# Patient Record
Sex: MALE | Race: WHITE | Employment: FULL TIME | URBAN - METROPOLITAN AREA
[De-identification: names, ages, dates, MRNs, and addresses within clinical notes are randomized per-mention and may not be internally consistent; named-entity substitution may affect disease eponyms.]

---

## 2021-06-29 ENCOUNTER — OFFICE VISIT (OUTPATIENT)
Dept: FAMILY MEDICINE CLINIC | Age: 36
End: 2021-06-29
Payer: COMMERCIAL

## 2021-06-29 VITALS
DIASTOLIC BLOOD PRESSURE: 72 MMHG | HEART RATE: 71 BPM | SYSTOLIC BLOOD PRESSURE: 112 MMHG | OXYGEN SATURATION: 97 % | HEIGHT: 69 IN | WEIGHT: 167.4 LBS | TEMPERATURE: 98.2 F | RESPIRATION RATE: 14 BRPM | BODY MASS INDEX: 24.79 KG/M2

## 2021-06-29 DIAGNOSIS — Z13.220 SCREENING, LIPID: ICD-10-CM

## 2021-06-29 DIAGNOSIS — Z13.1 SCREENING FOR DIABETES MELLITUS: ICD-10-CM

## 2021-06-29 DIAGNOSIS — Z13.0 SCREENING FOR DEFICIENCY ANEMIA: ICD-10-CM

## 2021-06-29 DIAGNOSIS — Z11.59 ENCOUNTER FOR HEPATITIS C SCREENING TEST FOR LOW RISK PATIENT: ICD-10-CM

## 2021-06-29 DIAGNOSIS — Z13.29 SCREENING FOR THYROID DISORDER: ICD-10-CM

## 2021-06-29 DIAGNOSIS — H93.A9 PULSATILE TINNITUS: ICD-10-CM

## 2021-06-29 DIAGNOSIS — Z13.21 ENCOUNTER FOR VITAMIN DEFICIENCY SCREENING: ICD-10-CM

## 2021-06-29 DIAGNOSIS — Z13.89 SCREENING FOR BLOOD OR PROTEIN IN URINE: ICD-10-CM

## 2021-06-29 DIAGNOSIS — Z82.49 FAMILY HISTORY OF CORONARY ARTERY DISEASE IN FATHER: ICD-10-CM

## 2021-06-29 DIAGNOSIS — R51.9 PRESSURE IN HEAD: ICD-10-CM

## 2021-06-29 DIAGNOSIS — Z76.89 ENCOUNTER TO ESTABLISH CARE: Primary | ICD-10-CM

## 2021-06-29 PROCEDURE — 99203 OFFICE O/P NEW LOW 30 MIN: CPT | Performed by: FAMILY MEDICINE

## 2021-06-29 NOTE — PROGRESS NOTES
Identified pt with two pt identifiers(name and ). Chief Complaint   Patient presents with   [address] [facility]    Head Pain     pressure in head when he bends down    Other     family HX of heart disease       Vitals:    21 1011   BP: 112/72   Pulse: 71   Resp: 14   Temp: 98.2 °F (36.8 °C)   TempSrc: Oral   SpO2: 97%   Weight: 167 lb 6.4 oz (75.9 kg)   Height: 5' 9\" (1.753 m)   PainSc:   0 - No pain      Health Maintenance Due   Topic    Hepatitis C Screening     DTaP/Tdap/Td series (1 - Tdap)       Depression Screening:  :     3 most recent PHQ Screens 2021   Little interest or pleasure in doing things Not at all   Feeling down, depressed, irritable, or hopeless Not at all   Total Score PHQ 2 0          Abuse Screening:  :     Abuse Screening Questionnaire 2021   Do you ever feel afraid of your partner? N   Are you in a relationship with someone who physically or mentally threatens you? N   Is it safe for you to go home? Y        Coordination of Care Questionnaire:  :     1. Have you been to the ER, urgent care clinic since your last visit? Hospitalized since your last visit? No    2. Have you seen or consulted any other health care providers outside of the [address] [place] [address] since your last visit? Include any pap smears or colon screening.  No

## 2021-06-29 NOTE — PROGRESS NOTES
Ave Ballesteros  43 y.o. male  1985  [address] [facility]  [id number]     [address] [facility]       Encounter Date: 6/29/2021           New Patient Visit Note: Giuseppe Rich MD    Previous PCP:urgent care    Reason for Appointment:  Chief Complaint   Patient presents with    New Patient    Head Pain     pressure in head when he bends down    Other     family HX of heart disease       History of Present Illness:  History provided by patient    Ave Ballesteros is a 43 y.o. male who presents to clinic today for appointment to establish care. Conditions addressed today include:     Pressure in Head/Pulastile tinnitus: he reports that he notices this more when he is in the shower. He also noticies it when he bends forward. He has history of headaches, but he feels that this is different. Family Hx of CAD in Father: First MI around age 72  He reports having a lot of seposure to smoke as a child  He reports that there are times when he feel that his BP is elevated  He reports that he would like to have children with his wife and he wants to be as healthy as possible. He reports occasional wine, but not very much. 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 Medications: (Updated to reflect final medication list after visit)  No current outpatient medications on file. History  Patient Care Team:  Brandt Ibanez MD as PCP - General (Family Medicine)  Brandt Ibanez MD as PCP - [facility]    Past Medical History: he has no past medical history on file. Past Surgical History: he has a past surgical history that includes hx wisdom teeth extraction. Family Medical History: family history includes Heart Attack in his father and paternal uncle.     Social History: he reports that he has never smoked. He has never used smokeless tobacco. He reports current alcohol use of about 4.0 standard drinks of alcohol per week. He reports that he does not use drugs. Hobbies include gardening, running, movies. Health Maintenance Due   Topic Date Due    DTaP/Tdap/Td series (1 - Tdap) Never done       Objective:   Visit Vitals  /72 (BP 1 Location: Left arm, BP Patient Position: Sitting, BP Cuff Size: Small adult)   Pulse 71   Temp 98.2 °F (36.8 °C) (Oral)   Resp 14   Ht 5' 9\" (1.753 m)   Wt 167 lb 6.4 oz (75.9 kg)   SpO2 97%   BMI 24.72 kg/m²     Wt Readings from Last 3 Encounters:   06/29/21 167 lb 6.4 oz (75.9 kg)       Physical Exam  Constitutional:       Appearance: Normal appearance. HENT:      Head: Normocephalic and atraumatic. Right Ear: External ear normal.      Left Ear: External ear normal.      Nose: Nose normal.      Mouth/Throat:      Pharynx: No oropharyngeal exudate. Eyes:      General: No scleral icterus. Right eye: No discharge. Left eye: No discharge. Conjunctiva/sclera: Conjunctivae normal.      Pupils: Pupils are equal, round, and reactive to light. Neck:      Thyroid: No thyromegaly. Vascular: No JVD. Trachea: No tracheal deviation. Cardiovascular:      Rate and Rhythm: Normal rate and regular rhythm. Heart sounds: Normal heart sounds. No murmur heard. No friction rub. No gallop. Pulmonary:      Effort: Pulmonary effort is normal. No respiratory distress. Breath sounds: Normal breath sounds. No stridor. No wheezing. Musculoskeletal:         General: No tenderness or deformity. Normal range of motion. Cervical back: Normal range of motion and neck supple. Lymphadenopathy:      Cervical: No cervical adenopathy. Skin:     General: Skin is warm and dry. Neurological:      Mental Status: He is alert. Cranial Nerves: No cranial nerve deficit.       Coordination: Coordination normal.      Gait: Gait is intact. Deep Tendon Reflexes: Reflexes normal.         Assessment & Plan:      ICD-10-CM ICD-9-CM    1. Encounter to establish care  Z76.89 V65.8    2. Family history of coronary artery disease in father  Z80.55 V17.3    3. Pressure in head  R51.9 784.0 MRI BRAIN WO CONT      TSH 3RD GENERATION      TSH 3RD GENERATION   4. Pulsatile tinnitus  H93. A9 388.30 MRI BRAIN WO CONT      TSH 3RD GENERATION      TSH 3RD GENERATION   5. Screening for thyroid disorder  Z13.29 V77.0 TSH 3RD GENERATION      T4, FREE      T4, FREE      TSH 3RD GENERATION   6. Screening for blood or protein in urine  Z13.89 V82.9 URINALYSIS W/ RFLX MICROSCOPIC      URINALYSIS W/ RFLX MICROSCOPIC   7. Screening, lipid  Z13.220 V77.91 LIPID PANEL      METABOLIC PANEL, COMPREHENSIVE      METABOLIC PANEL, COMPREHENSIVE      LIPID PANEL   8. Screening for diabetes mellitus  Z13.1 V77.1 HEMOGLOBIN A1C WITH EAG      HEMOGLOBIN A1C WITH EAG   9. Screening for deficiency anemia  Z13.0 V78.1 CBC W/O DIFF      CBC W/O DIFF   10. Encounter for vitamin deficiency screening  Z13.21 V77.99 VITAMIN D, 25 HYDROXY      VITAMIN D, 25 HYDROXY   11. Encounter for hepatitis C screening test for low risk patient  Z11.59 V73.89 HCV AB W/RFLX TO NICCI      HCV AB W/RFLX TO NICCI        Encounter to Establish Care: New patient visit; reviewed and addressed patient's medical history and concerns as discussed in note. Discussed recommendations for diet, exercise, and lifestyle.  Pressure in Head and Pulsatile tinnitus: Chronic, uncontrolled. Obtain MRI as above.  RTC 4 weeks to discuss lab results and imaging      I have discussed the diagnosis with the patient and the intended plan as seen in the above orders. The patient has received an after-visit summary along with patient information handout. I have discussed medication side effects and warnings with the patient as well.     Disposition  Follow-up and Dispositions    · Return in about 9 weeks (around 8/31/2021) for annual physical.           Laure Foss MD

## 2021-06-30 LAB
25(OH)D3 SERPL-MCNC: 28.7 NG/ML (ref 30–100)
ALBUMIN SERPL-MCNC: 4.4 G/DL (ref 3.5–5)
ALBUMIN/GLOB SERPL: 1.5 {RATIO} (ref 1.1–2.2)
ALP SERPL-CCNC: 49 U/L (ref 45–117)
ALT SERPL-CCNC: 31 U/L (ref 12–78)
ANION GAP SERPL CALC-SCNC: 4 MMOL/L (ref 5–15)
APPEARANCE UR: CLEAR
AST SERPL-CCNC: 17 U/L (ref 15–37)
BILIRUB SERPL-MCNC: 0.5 MG/DL (ref 0.2–1)
BILIRUB UR QL: NEGATIVE
BUN SERPL-MCNC: 19 MG/DL (ref 6–20)
BUN/CREAT SERPL: 21 (ref 12–20)
CALCIUM SERPL-MCNC: 9.8 MG/DL (ref 8.5–10.1)
CHLORIDE SERPL-SCNC: 107 MMOL/L (ref 97–108)
CHOLEST SERPL-MCNC: 199 MG/DL
CO2 SERPL-SCNC: 29 MMOL/L (ref 21–32)
COLOR UR: NORMAL
CREAT SERPL-MCNC: 0.91 MG/DL (ref 0.7–1.3)
ERYTHROCYTE [DISTWIDTH] IN BLOOD BY AUTOMATED COUNT: 12.9 % (ref 11.5–14.5)
EST. AVERAGE GLUCOSE BLD GHB EST-MCNC: 100 MG/DL
GLOBULIN SER CALC-MCNC: 3 G/DL (ref 2–4)
GLUCOSE SERPL-MCNC: 96 MG/DL (ref 65–100)
GLUCOSE UR STRIP.AUTO-MCNC: NEGATIVE MG/DL
HBA1C MFR BLD: 5.1 % (ref 4–5.6)
HCT VFR BLD AUTO: 45.2 % (ref 36.6–50.3)
HDLC SERPL-MCNC: 57 MG/DL
HDLC SERPL: 3.5 {RATIO} (ref 0–5)
HGB BLD-MCNC: 14.9 G/DL (ref 12.1–17)
HGB UR QL STRIP: NEGATIVE
KETONES UR QL STRIP.AUTO: NEGATIVE MG/DL
LDLC SERPL CALC-MCNC: 126.4 MG/DL (ref 0–100)
LEUKOCYTE ESTERASE UR QL STRIP.AUTO: NEGATIVE
MCH RBC QN AUTO: 29.7 PG (ref 26–34)
MCHC RBC AUTO-ENTMCNC: 33 G/DL (ref 30–36.5)
MCV RBC AUTO: 90.2 FL (ref 80–99)
NITRITE UR QL STRIP.AUTO: NEGATIVE
NRBC # BLD: 0 K/UL (ref 0–0.01)
NRBC BLD-RTO: 0 PER 100 WBC
PH UR STRIP: 7.5 [PH] (ref 5–8)
PLATELET # BLD AUTO: 202 K/UL (ref 150–400)
PMV BLD AUTO: 10 FL (ref 8.9–12.9)
POTASSIUM SERPL-SCNC: 4.4 MMOL/L (ref 3.5–5.1)
PROT SERPL-MCNC: 7.4 G/DL (ref 6.4–8.2)
PROT UR STRIP-MCNC: NEGATIVE MG/DL
RBC # BLD AUTO: 5.01 M/UL (ref 4.1–5.7)
SODIUM SERPL-SCNC: 140 MMOL/L (ref 136–145)
SP GR UR REFRACTOMETRY: 1.02 (ref 1–1.03)
T4 FREE SERPL-MCNC: 0.8 NG/DL (ref 0.8–1.5)
TRIGL SERPL-MCNC: 78 MG/DL (ref ?–150)
TSH SERPL DL<=0.05 MIU/L-ACNC: 1.7 UIU/ML (ref 0.36–3.74)
UROBILINOGEN UR QL STRIP.AUTO: 0.2 EU/DL (ref 0.2–1)
VLDLC SERPL CALC-MCNC: 15.6 MG/DL
WBC # BLD AUTO: 4.5 K/UL (ref 4.1–11.1)

## 2021-07-01 LAB
HCV AB S/CO SERPL IA: <0.1 S/CO RATIO (ref 0–0.9)
HCV AB SERPL QL IA: NORMAL

## 2021-07-07 PROBLEM — Z82.49 FAMILY HISTORY OF CORONARY ARTERY DISEASE IN FATHER: Status: ACTIVE | Noted: 2021-07-07

## 2021-07-07 PROBLEM — H93.A9 PULSATILE TINNITUS: Status: ACTIVE | Noted: 2021-07-07

## 2021-07-07 PROBLEM — R51.9 PRESSURE IN HEAD: Status: ACTIVE | Noted: 2021-07-07

## 2021-09-28 ENCOUNTER — OFFICE VISIT (OUTPATIENT)
Dept: FAMILY MEDICINE CLINIC | Age: 36
End: 2021-09-28
Payer: COMMERCIAL

## 2021-09-28 VITALS
OXYGEN SATURATION: 97 % | HEART RATE: 73 BPM | HEIGHT: 69 IN | BODY MASS INDEX: 25.48 KG/M2 | WEIGHT: 172 LBS | RESPIRATION RATE: 16 BRPM | TEMPERATURE: 98.6 F | DIASTOLIC BLOOD PRESSURE: 65 MMHG | SYSTOLIC BLOOD PRESSURE: 110 MMHG

## 2021-09-28 DIAGNOSIS — E55.9 VITAMIN D INSUFFICIENCY: ICD-10-CM

## 2021-09-28 DIAGNOSIS — H93.A9 PULSATILE TINNITUS: ICD-10-CM

## 2021-09-28 DIAGNOSIS — R51.9 PRESSURE IN HEAD: ICD-10-CM

## 2021-09-28 DIAGNOSIS — Z00.00 ANNUAL PHYSICAL EXAM: Primary | ICD-10-CM

## 2021-09-28 DIAGNOSIS — Z23 ENCOUNTER FOR IMMUNIZATION: ICD-10-CM

## 2021-09-28 DIAGNOSIS — Z82.49 FAMILY HISTORY OF CORONARY ARTERY DISEASE IN FATHER: ICD-10-CM

## 2021-09-28 PROCEDURE — 90471 IMMUNIZATION ADMIN: CPT | Performed by: FAMILY MEDICINE

## 2021-09-28 PROCEDURE — 99395 PREV VISIT EST AGE 18-39: CPT | Performed by: FAMILY MEDICINE

## 2021-09-28 PROCEDURE — 90715 TDAP VACCINE 7 YRS/> IM: CPT | Performed by: FAMILY MEDICINE

## 2021-09-28 NOTE — PROGRESS NOTES
Chief Complaint   Patient presents with    Complete Physical        1. \"Have you been to the ER, urgent care clinic since your last visit? Hospitalized since your last visit? \" No    2. \"Have you seen or consulted any other health care providers outside of the [address] [place] [address] since your last visit? \" No     3. For patients aged 39-70: Has the patient had a colonoscopy? No     If the patient is female:    4. For patients aged 41-77: Has the patient had a mammogram within the past 2 years? No    5. For patients aged 21-30: Has the patient had a pap smear?  No    3 most recent PHQ Screens 9/28/2021   Little interest or pleasure in doing things Not at all   Feeling down, depressed, irritable, or hopeless Not at all   Total Score PHQ 2 0

## 2021-09-28 NOTE — PROGRESS NOTES
Zulema Loredo  43 y.o. male  1985  [address] [facility]  [id number]     [address] [facility]       Encounter Date: 9/28/2021           Established Patient Visit Note: Sanna Bhandari MD    Reason for Appointment:  Chief Complaint   Patient presents with    Complete Physical    Immunization/Injection     TDAP       History of Present Illness:  History provided by patient    Zulema Loredo is a 43 y.o. male who presents to clinic today for:    Annual Physical  Last Physical: > 1 year ago  Screenings: denies any concern for depression or STI; last lipids were June 2021  Immunizations: has had COVID vaccine;  Due for flu and Tdap  Diet: he eats a variety of vegetables and meats, drinks milk and water  Exercise: walks regularly, runs in evening, works around yard  Interval History: denies any recent hospitalizations or illnesses    · Pressure in Head/Pulsatile Tinnitus: MRI ordered at last visit, but he has not yet had this done. He reports that he continues to have intermittnet symtpoms, but not as severe as they were. 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 Medications: (Updated to reflect final medication list after visit)  No current outpatient medications on file. History  Patient Care Team:  Lorena Colon MD as PCP - General (Family Medicine)  Lorena Colon MD as PCP - [facility]    Past Medical History: he has no past medical history on file. Past Surgical History: he has a past surgical history that includes hx wisdom teeth extraction.     Family Medical History: family history includes Heart Attack in his father and paternal uncle. Social History: he reports that he has never smoked. He has never used smokeless tobacco. He reports current alcohol use of about 4.0 standard drinks of alcohol per week. He reports that he does not use drugs. Health Maintenance Due   Topic Date Due    Flu Vaccine (1) Never done       Objective:   Visit Vitals  /65   Pulse 73   Temp 98.6 °F (37 °C)   Resp 16   Ht 5' 9\" (1.753 m)   Wt 172 lb (78 kg)   SpO2 97%   BMI 25.40 kg/m²     Wt Readings from Last 3 Encounters:   09/28/21 172 lb (78 kg)   06/29/21 167 lb 6.4 oz (75.9 kg)       Physical Exam  HENT:      Head: Normocephalic and atraumatic. Right Ear: External ear normal.      Left Ear: External ear normal.      Nose: Nose normal.      Mouth/Throat:      Mouth: Mucous membranes are moist.      Pharynx: No oropharyngeal exudate. Eyes:      General: Lids are normal. No scleral icterus. Right eye: No discharge. Left eye: No discharge. Extraocular Movements: Extraocular movements intact. Conjunctiva/sclera: Conjunctivae normal.      Pupils: Pupils are equal, round, and reactive to light. Neck:      Thyroid: No thyromegaly. Vascular: No JVD. Trachea: No tracheal deviation. Cardiovascular:      Rate and Rhythm: Normal rate and regular rhythm. Pulses:           Radial pulses are 2+ on the right side and 2+ on the left side. Heart sounds: Normal heart sounds. No murmur heard. No friction rub. No gallop. Pulmonary:      Effort: Pulmonary effort is normal. No respiratory distress. Breath sounds: Normal breath sounds. No stridor. No wheezing. Abdominal:      General: Bowel sounds are normal. There is no distension. Palpations: Abdomen is soft. There is no mass. Tenderness: There is no abdominal tenderness. There is no guarding or rebound. Musculoskeletal:         General: No tenderness or deformity. Normal range of motion. Cervical back: Normal range of motion and neck supple. Right lower leg: No edema. Left lower leg: No edema. Lymphadenopathy:      Cervical: No cervical adenopathy. Skin:     General: Skin is warm and dry. Neurological:      Mental Status: He is alert. Cranial Nerves: No cranial nerve deficit. Coordination: Coordination normal.      Gait: Gait is intact. Deep Tendon Reflexes: Reflexes normal.   Psychiatric:         Mood and Affect: Mood normal.         Behavior: Behavior normal.         Thought Content: Thought content normal.         Assessment & Plan:      ICD-10-CM ICD-9-CM    1. Annual physical exam  Z00.00 V70.0    2. Family history of coronary artery disease in father  Z80.55 V17.3 CT HEART W/O CONT WITH CALCIUM   3. Pressure in head  R51.9 784.0    4. Pulsatile tinnitus  H93. A9 388.30    5. Vitamin D insufficiency  E55.9 268.9    6. Encounter for immunization  Z23 V03.89 [place] IMMUNIZ ADMIN,1 SINGLE/COMB VAC/TOXOID      TETANUS, DIPHTHERIA TOXOIDS AND ACELLULAR PERTUSSIS VACCINE (TDAP), IN INDIVIDS. >=7, IM      Annual Physical Exam: Reviewed and addressed patient's medical history and concerns as discussed in note. Reviewed recommended screenings and immunizations. Discussed recommendations for diet, exercise, and lifestyle. · Family Hx of CAD: Chronic, unclear status. Discussed options. Patient would like to obtain CT to further stratify risk. · Pressure in head, Pulsatile tinnitus: Chronic, uncontrolled. Advised patient to obtain MRI as previously ordered. · Vitamin D Insufficiency: Chronic, uncontrolled. Start OTC vitamin D supplementation. · Immunizations: Provided immunizations as written above. Discussed risks/benefits of vaccination, and patient would like to proceed. Provided vaccine information sheet to patient. Discussed precautions and reasons to call or go to ED.    · Declines flu vaccine, but he is agreeable to Tdap        I have discussed the diagnosis with the patient and the intended plan as seen in the above orders. The patient has received an after-visit summary along with patient information handout. I have discussed medication side effects and warnings with the patient as well. Disposition  Follow-up and Dispositions    · Return in about 6 months (around 3/28/2022) for tinnitus.            Chiki Nicole MD

## 2021-09-28 NOTE — PROGRESS NOTES
Chief Complaint   Patient presents with    Complete Physical    Immunization/Injection     TDAP     Nadia Obrien  is a 43 y.o.  male  who present for TDAP immunizations/injections. He/she denies any symptoms , reactions or allergies that would exclude them from being immunized today. Risks and adverse reactions were discussed and the VIS was given if applicable to them. All questions were addressed. He/She was observed for 10 min post injection. There were no reactions observed.     Aurelia Hernandez LPN

## 2022-03-28 ENCOUNTER — OFFICE VISIT (OUTPATIENT)
Dept: FAMILY MEDICINE CLINIC | Age: 37
End: 2022-03-28
Payer: COMMERCIAL

## 2022-03-28 VITALS
HEIGHT: 69 IN | OXYGEN SATURATION: 99 % | TEMPERATURE: 97.7 F | SYSTOLIC BLOOD PRESSURE: 100 MMHG | HEART RATE: 66 BPM | DIASTOLIC BLOOD PRESSURE: 60 MMHG | WEIGHT: 175.4 LBS | RESPIRATION RATE: 18 BRPM | BODY MASS INDEX: 25.98 KG/M2

## 2022-03-28 DIAGNOSIS — Z78.9 LDL-C GREATER THAN OR EQUAL TO 100 MG/DL: ICD-10-CM

## 2022-03-28 DIAGNOSIS — E55.9 VITAMIN D INSUFFICIENCY: ICD-10-CM

## 2022-03-28 DIAGNOSIS — Z01.83 ENCOUNTER FOR BLOOD TYPING: ICD-10-CM

## 2022-03-28 DIAGNOSIS — Z82.49 FAMILY HISTORY OF CORONARY ARTERY DISEASE IN FATHER: Primary | ICD-10-CM

## 2022-03-28 DIAGNOSIS — H93.A9 PULSATILE TINNITUS: ICD-10-CM

## 2022-03-28 DIAGNOSIS — R51.9 PRESSURE IN HEAD: ICD-10-CM

## 2022-03-28 PROCEDURE — 99213 OFFICE O/P EST LOW 20 MIN: CPT | Performed by: FAMILY MEDICINE

## 2022-03-28 NOTE — PROGRESS NOTES
Teresa Ch  43 y.o. male  1985  [address] [facility]  [id number]     [address] [facility]       Encounter Date: 3/28/2022           Established Patient Visit Note: Elly Jhaveri MD    Reason for Appointment:  Chief Complaint   Patient presents with    Follow-up     6 mth          History of Present Illness:  History provided by patient    Teresa Ch is a 43 y.o. male who presents to clinic today for:     He is interested in learning his bloodtype  · Family Hx of CAD:CT Calcium ordered at last visit, but he would like to hold off on this for now. · Pressure in head, Pulsatile tinnitus: He reports that pulsatile tinnitis has resolved, but the pressure tends comes from time to time. He would like to hold off on MRI that was previously ordered. · Vitamin D Insufficiency: he is not taking vitamin d; last Vit D was 6/29/21  · Elevated LDL: he has been trying to eat healthier (more greens and vegetables)  · He has been walking more, including swimming and running          HM  He reports having booster COVID vaccine  Declines Flu vaccine      Review of Systems  All other ROS were reviewed and are negative except as discussed in HPI        Allergies: Patient has no known allergies. Medications:   No current outpatient medications on file. History  Patient Care Team:  Kenneth Lim MD as PCP - General (Family Medicine)  Kenneth Lim MD as PCP - [facility] Provider    Past Medical History: he has no past medical history on file. Past Surgical History: he has a past surgical history that includes hx wisdom teeth extraction. Family Medical History: family history includes Heart Attack in his father and paternal uncle. Social History: he reports that he has never smoked. He has never used smokeless tobacco. He reports current alcohol use of about 4.0 standard drinks of alcohol per week. He reports that he does not use drugs.         Objective: Visit Vitals  /60 (BP 1 Location: Left upper arm, BP Patient Position: Sitting, BP Cuff Size: Large adult)   Pulse 66   Temp 97.7 °F (36.5 °C) (Temporal)   Resp 18   Ht 5' 9\" (1.753 m)   Wt 175 lb 6.4 oz (79.6 kg)   SpO2 99%   BMI 25.90 kg/m²     Wt Readings from Last 3 Encounters:   03/28/22 175 lb 6.4 oz (79.6 kg)   09/28/21 172 lb (78 kg)   06/29/21 167 lb 6.4 oz (75.9 kg)       Physical Exam  Constitutional:       Appearance: Normal appearance. HENT:      Head: Normocephalic and atraumatic. Right Ear: External ear normal.      Left Ear: External ear normal.      Nose: Nose normal.      Mouth/Throat:      Pharynx: No oropharyngeal exudate. Eyes:      General: No scleral icterus. Right eye: No discharge. Left eye: No discharge. Conjunctiva/sclera: Conjunctivae normal.      Pupils: Pupils are equal, round, and reactive to light. Neck:      Thyroid: No thyromegaly. Vascular: No JVD. Trachea: No tracheal deviation. Cardiovascular:      Rate and Rhythm: Normal rate and regular rhythm. Heart sounds: Normal heart sounds. No murmur heard. No friction rub. No gallop. Pulmonary:      Effort: Pulmonary effort is normal. No respiratory distress. Breath sounds: Normal breath sounds. No stridor. No wheezing. Musculoskeletal:         General: No tenderness or deformity. Normal range of motion. Cervical back: Normal range of motion and neck supple. Lymphadenopathy:      Cervical: No cervical adenopathy. Skin:     General: Skin is warm and dry. Neurological:      Mental Status: He is alert. Cranial Nerves: No cranial nerve deficit. Coordination: Coordination normal.      Gait: Gait is intact. Deep Tendon Reflexes: Reflexes normal.         Assessment & Plan:      ICD-10-CM ICD-9-CM    1. Family history of coronary artery disease in father  Z80.55 V17.3    2. Encounter for blood typing  Z01.83 V72.86 TYPE & SCREEN      TYPE & SCREEN   3. Vitamin D insufficiency  E55.9 268.9 VITAMIN D, 25 HYDROXY      VITAMIN D, 25 HYDROXY   4. Pressure in head  R51.9 784.0    5. Pulsatile tinnitus  H93. A9 388.30    6. LDL-c greater than or equal to 100 mg/dl  Z78.9 V49.89      All conditions listed above: Chronic, stable. Will continue current treatment regimen. Will check labs as reflected above. Discussed following up with specialist as scheduled. Discussed recommendations for diet and exercise. I have discussed the diagnosis with the patient and the intended plan as seen in the above orders. The patient has received an after-visit summary along with patient information handout. I have discussed medication side effects and warnings with the patient as well.     Disposition  Follow-up and Dispositions    · Return in about 6 months (around 9/28/2022) for annual physical.           Bety Rose MD

## 2022-03-28 NOTE — PROGRESS NOTES
Chief Complaint   Patient presents with    Follow-up     6 mth      1. \"Have you been to the ER, urgent care clinic since your last visit? Hospitalized since your last visit? \" No    2. \"Have you seen or consulted any other health care providers outside of the [address] [place] [address] since your last visit? \" No     3. For patients aged 39-70: Has the patient had a colonoscopy / FIT/ Cologuard? NA - based on age      If the patient is female:    4. For patients aged 41-77: Has the patient had a mammogram within the past 2 years? NA - based on age or sex      11. For patients aged 21-65: Has the patient had a pap smear?  NA - based on age or sex

## 2022-03-29 LAB
25(OH)D3 SERPL-MCNC: 26.4 NG/ML (ref 30–100)
ABO + RH BLD: NORMAL
BLOOD BANK CMNT PATIENT-IMP: NORMAL
BLOOD GROUP ANTIBODIES SERPL: NORMAL
SPECIMEN EXP DATE BLD: NORMAL

## 2022-03-29 NOTE — PROGRESS NOTES
Dear Todd Barrios,    Your vitamin D is a little low. I recommend starting a vitamin d supplement of 1000 international units daily. Your blood type is A positive.      Manju Metcalf MD

## 2022-09-28 ENCOUNTER — OFFICE VISIT (OUTPATIENT)
Dept: FAMILY MEDICINE CLINIC | Age: 37
End: 2022-09-28
Payer: COMMERCIAL

## 2022-09-28 VITALS
OXYGEN SATURATION: 99 % | TEMPERATURE: 97.7 F | WEIGHT: 170.8 LBS | HEIGHT: 69 IN | BODY MASS INDEX: 25.3 KG/M2 | RESPIRATION RATE: 14 BRPM | DIASTOLIC BLOOD PRESSURE: 64 MMHG | SYSTOLIC BLOOD PRESSURE: 114 MMHG | HEART RATE: 71 BPM

## 2022-09-28 DIAGNOSIS — Z13.220 SCREENING, LIPID: ICD-10-CM

## 2022-09-28 DIAGNOSIS — Z13.1 SCREENING FOR DIABETES MELLITUS: ICD-10-CM

## 2022-09-28 DIAGNOSIS — Z00.00 ANNUAL PHYSICAL EXAM: Primary | ICD-10-CM

## 2022-09-28 DIAGNOSIS — E55.9 VITAMIN D INSUFFICIENCY: ICD-10-CM

## 2022-09-28 DIAGNOSIS — Z78.9 LDL-C GREATER THAN OR EQUAL TO 100 MG/DL: ICD-10-CM

## 2022-09-28 DIAGNOSIS — Z13.89 SCREENING FOR BLOOD OR PROTEIN IN URINE: ICD-10-CM

## 2022-09-28 DIAGNOSIS — Z13.21 ENCOUNTER FOR VITAMIN DEFICIENCY SCREENING: ICD-10-CM

## 2022-09-28 DIAGNOSIS — Z13.0 SCREENING FOR DEFICIENCY ANEMIA: ICD-10-CM

## 2022-09-28 PROCEDURE — 99395 PREV VISIT EST AGE 18-39: CPT | Performed by: FAMILY MEDICINE

## 2022-09-28 NOTE — PROGRESS NOTES
Chief Complaint   Patient presents with    Complete Physical       1. \"Have you been to the ER, urgent care clinic since your last visit? Hospitalized since your last visit? \" No    2. \"Have you seen or consulted any other health care providers outside of the [address] [place] [address] since your last visit? \" No     3. For patients aged 39-70: Has the patient had a colonoscopy / FIT/ Cologuard? NA - based on age      If the patient is female:    4. For patients aged 41-77: Has the patient had a mammogram within the past 2 years? NA - based on age or sex      11. For patients aged 21-65: Has the patient had a pap smear? NA - based on age or sex         3 most recent PHQ Screens 9/28/2022   Little interest or pleasure in doing things Not at all   Feeling down, depressed, irritable, or hopeless Not at all   Total Score PHQ 2 0         Abuse Screening Questionnaire 9/28/2022   Do you ever feel afraid of your partner? N   Are you in a relationship with someone who physically or mentally threatens you? N   Is it safe for you to go home?  Y          Health Maintenance Due   Topic Date Due    Flu Vaccine (1) Never done

## 2022-09-28 NOTE — PROGRESS NOTES
Keron Mendoza  42 y.o. male  1985  [address] [facility]  [id number]     [address] [facility] PRACTICE       Encounter Date: 9/28/2022           Established Patient Visit Note: Courtney Villa MD    Reason for Appointment:  Chief Complaint   Patient presents with    Complete Physical         History of Present Illness:  History provided by patient    Keron Mendoza is a 42 y.o. male who presents to clinic today for:     Annual Physical    Family Hx of CAD:patient would like to hold off on calcium score for now  Pressure in head, Pulsatile tinnitus: he denies any further symptoms. He reports that he believes that it may have been related to stress. Vitamin D Insufficiency: he reports taking vitamin d supplementation for a couple months, but he stopped taking it after about 2 months. Elevated LDL: he has been trying to eat healthier (more greens and vegetables)  He has been walking more, including swimming and running      Diet: he his reduced his intake of friend food and red meat; he also reports trying to increase his vegetable intake. Working towards a [employer]. Exercise: he has been trying to walk, swim, biking, light resistance exercise. Vaccines  COVID Vaccine: he is due for COVID booster. Flu: he would like to hold off on getting flu vaccine             Review of Systems  All other ROS were reviewed and are negative except as discussed in HPI            Allergies: Patient has no known allergies. Medications:   No current outpatient medications on file. History  Patient Care Team:  Abiola Lockett MD as PCP - General (Family Medicine)  Abiola Lockett MD as PCP - [facility] Provider    Past Medical History: he has no past medical history on file. Past Surgical History: he has a past surgical history that includes hx wisdom teeth extraction.     Family Medical History: family history includes Heart Attack in his father and paternal uncle.    Social History: he reports that he has never smoked. He has never used smokeless tobacco. He reports current alcohol use of about 4.0 standard drinks per week. He reports that he does not use drugs. Objective:   Visit Vitals  /64 (BP 1 Location: Left arm, BP Patient Position: Sitting, BP Cuff Size: Adult)   Pulse 71   Temp 97.7 °F (36.5 °C) (Temporal)   Resp 14   Ht 5' 9\" (1.753 m)   Wt 170 lb 12.8 oz (77.5 kg)   SpO2 99%   BMI 25.22 kg/m²     Wt Readings from Last 3 Encounters:   09/28/22 170 lb 12.8 oz (77.5 kg)   03/28/22 175 lb 6.4 oz (79.6 kg)   09/28/21 172 lb (78 kg)       Physical Exam  HENT:      Head: Normocephalic and atraumatic. Right Ear: External ear normal.      Left Ear: External ear normal.      Nose: Nose normal.      Mouth/Throat:      Mouth: Mucous membranes are moist.      Pharynx: No oropharyngeal exudate. Eyes:      General: Lids are normal. No scleral icterus. Right eye: No discharge. Left eye: No discharge. Extraocular Movements: Extraocular movements intact. Conjunctiva/sclera: Conjunctivae normal.      Pupils: Pupils are equal, round, and reactive to light. Neck:      Thyroid: No thyromegaly. Vascular: No JVD. Trachea: No tracheal deviation. Cardiovascular:      Rate and Rhythm: Normal rate and regular rhythm. Pulses:           Radial pulses are 2+ on the right side and 2+ on the left side. Heart sounds: Normal heart sounds. No murmur heard. No friction rub. No gallop. Pulmonary:      Effort: Pulmonary effort is normal. No respiratory distress. Breath sounds: Normal breath sounds. No stridor. No wheezing. Abdominal:      General: Bowel sounds are normal. There is no distension. Palpations: Abdomen is soft. There is no mass. Tenderness: There is no abdominal tenderness. There is no guarding or rebound. Musculoskeletal:         General: No tenderness or deformity. Normal range of motion. Cervical back: Normal range of motion and neck supple. Right lower leg: No edema. Left lower leg: No edema. Lymphadenopathy:      Cervical: No cervical adenopathy. Skin:     General: Skin is warm and dry. Neurological:      Mental Status: He is alert. Cranial Nerves: No cranial nerve deficit. Coordination: Coordination normal.      Gait: Gait is intact. Deep Tendon Reflexes: Reflexes normal.   Psychiatric:         Mood and Affect: Mood normal.         Behavior: Behavior normal.         Thought Content: Thought content normal.       Assessment & Plan:    ICD-10-CM ICD-9-CM    1. Annual physical exam  Z00.00 V70.0 CBC W/O DIFF      METABOLIC PANEL, COMPREHENSIVE      HEMOGLOBIN A1C WITH EAG      LIPID PANEL      VITAMIN D, 25 HYDROXY      URINALYSIS W/ RFLX MICROSCOPIC      URINALYSIS W/ RFLX MICROSCOPIC      VITAMIN D, 25 HYDROXY      LIPID PANEL      HEMOGLOBIN A1C WITH EAG      METABOLIC PANEL, COMPREHENSIVE      CBC W/O DIFF      2. Vitamin D insufficiency  E55.9 268.9 VITAMIN D, 25 HYDROXY      VITAMIN D, 25 HYDROXY      3. LDL-c greater than or equal to 100 mg/dl  X33.0 J37.84 METABOLIC PANEL, COMPREHENSIVE      LIPID PANEL      LIPID PANEL      METABOLIC PANEL, COMPREHENSIVE      4. Screening, lipid  Z13.220 V77.91       5. Screening for diabetes mellitus  Z13.1 V77.1 HEMOGLOBIN A1C WITH EAG      HEMOGLOBIN A1C WITH EAG      6. Screening for deficiency anemia  Z13.0 V78.1 CBC W/O DIFF      CBC W/O DIFF      7. Encounter for vitamin deficiency screening  Z13.21 V77.99       8. Screening for blood or protein in urine  Z13.89 V82.9 URINALYSIS W/ RFLX MICROSCOPIC      URINALYSIS W/ RFLX MICROSCOPIC        Annual Physical Exam: Reviewed and addressed patient's medical history and concerns as discussed in note. Reviewed recommended screenings and immunizations. Discussed recommendations for diet, exercise, and lifestyle.       I have discussed the diagnosis with the patient and the intended plan as seen in the above orders. The patient has received an after-visit summary along with patient information handout. I have discussed medication side effects and warnings with the patient as well. Disposition  Follow-up and Dispositions    Return in about 1 year (around 9/28/2023) for annual physical, send message 6 weeks ahead to discuss ordering labs.            Kandi Thorne MD

## 2022-09-29 LAB
25(OH)D3 SERPL-MCNC: 38.4 NG/ML (ref 30–100)
ALBUMIN SERPL-MCNC: 4.6 G/DL (ref 3.5–5)
ALBUMIN/GLOB SERPL: 1.4 {RATIO} (ref 1.1–2.2)
ALP SERPL-CCNC: 47 U/L (ref 45–117)
ALT SERPL-CCNC: 33 U/L (ref 12–78)
ANION GAP SERPL CALC-SCNC: 3 MMOL/L (ref 5–15)
APPEARANCE UR: CLEAR
AST SERPL-CCNC: 16 U/L (ref 15–37)
BILIRUB SERPL-MCNC: 1 MG/DL (ref 0.2–1)
BILIRUB UR QL: NEGATIVE
BUN SERPL-MCNC: 24 MG/DL (ref 6–20)
BUN/CREAT SERPL: 22 (ref 12–20)
CALCIUM SERPL-MCNC: 10 MG/DL (ref 8.5–10.1)
CHLORIDE SERPL-SCNC: 106 MMOL/L (ref 97–108)
CHOLEST SERPL-MCNC: 207 MG/DL
CO2 SERPL-SCNC: 28 MMOL/L (ref 21–32)
COLOR UR: ABNORMAL
CREAT SERPL-MCNC: 1.09 MG/DL (ref 0.7–1.3)
ERYTHROCYTE [DISTWIDTH] IN BLOOD BY AUTOMATED COUNT: 12.8 % (ref 11.5–14.5)
EST. AVERAGE GLUCOSE BLD GHB EST-MCNC: 103 MG/DL
GLOBULIN SER CALC-MCNC: 3.2 G/DL (ref 2–4)
GLUCOSE SERPL-MCNC: 93 MG/DL (ref 65–100)
GLUCOSE UR STRIP.AUTO-MCNC: NEGATIVE MG/DL
HBA1C MFR BLD: 5.2 % (ref 4–5.6)
HCT VFR BLD AUTO: 45.7 % (ref 36.6–50.3)
HDLC SERPL-MCNC: 66 MG/DL
HDLC SERPL: 3.1 {RATIO} (ref 0–5)
HGB BLD-MCNC: 15 G/DL (ref 12.1–17)
HGB UR QL STRIP: NEGATIVE
KETONES UR QL STRIP.AUTO: 15 MG/DL
LDLC SERPL CALC-MCNC: 132.2 MG/DL (ref 0–100)
LEUKOCYTE ESTERASE UR QL STRIP.AUTO: NEGATIVE
MCH RBC QN AUTO: 30.1 PG (ref 26–34)
MCHC RBC AUTO-ENTMCNC: 32.8 G/DL (ref 30–36.5)
MCV RBC AUTO: 91.6 FL (ref 80–99)
NITRITE UR QL STRIP.AUTO: NEGATIVE
NRBC # BLD: 0 K/UL (ref 0–0.01)
NRBC BLD-RTO: 0 PER 100 WBC
PH UR STRIP: 5.5 [PH] (ref 5–8)
PLATELET # BLD AUTO: 212 K/UL (ref 150–400)
PMV BLD AUTO: 9.9 FL (ref 8.9–12.9)
POTASSIUM SERPL-SCNC: 4.2 MMOL/L (ref 3.5–5.1)
PROT SERPL-MCNC: 7.8 G/DL (ref 6.4–8.2)
PROT UR STRIP-MCNC: NEGATIVE MG/DL
RBC # BLD AUTO: 4.99 M/UL (ref 4.1–5.7)
SODIUM SERPL-SCNC: 137 MMOL/L (ref 136–145)
SP GR UR REFRACTOMETRY: 1.01 (ref 1–1.03)
TRIGL SERPL-MCNC: 44 MG/DL (ref ?–150)
UROBILINOGEN UR QL STRIP.AUTO: 0.2 EU/DL (ref 0.2–1)
VLDLC SERPL CALC-MCNC: 8.8 MG/DL
WBC # BLD AUTO: 4.1 K/UL (ref 4.1–11.1)

## 2022-09-30 NOTE — PROGRESS NOTES
Called patient and discussed lab results. Chol and LDL a little elevated from last check. Will continue to monitor. Ketones in urine likely 2/2 fasting state. Remainder of labs stable.